# Patient Record
Sex: MALE | Race: WHITE | NOT HISPANIC OR LATINO | URBAN - NONMETROPOLITAN AREA
[De-identification: names, ages, dates, MRNs, and addresses within clinical notes are randomized per-mention and may not be internally consistent; named-entity substitution may affect disease eponyms.]

---

## 2018-02-14 NOTE — PATIENT DISCUSSION
GLS CHECK   NEW MR TODAY. PT GIVEN NEW GLS RX TODAY TO HAVE GLS REMADE AT [facility]. RETURN AS [address].

## 2019-02-07 ENCOUNTER — IMPORTED ENCOUNTER (OUTPATIENT)
Dept: URBAN - NONMETROPOLITAN AREA CLINIC 1 | Facility: CLINIC | Age: 59
End: 2019-02-07

## 2019-02-07 PROCEDURE — 92014 COMPRE OPH EXAM EST PT 1/>: CPT

## 2019-02-07 PROCEDURE — 92015 DETERMINE REFRACTIVE STATE: CPT

## 2019-02-07 PROCEDURE — 92310 CONTACT LENS FITTING OU: CPT

## 2019-02-07 NOTE — PATIENT DISCUSSION
Doing well with Acuvue [place] for Presyopia OU. BF rx given. Has SV computer rx (with +1.50 over).; 's Notes: [facility] [place] supply & logistics

## 2020-03-11 ENCOUNTER — IMPORTED ENCOUNTER (OUTPATIENT)
Dept: URBAN - NONMETROPOLITAN AREA CLINIC 1 | Facility: CLINIC | Age: 60
End: 2020-03-11

## 2020-03-11 PROCEDURE — 92014 COMPRE OPH EXAM EST PT 1/>: CPT

## 2020-03-11 PROCEDURE — 92310 CONTACT LENS FITTING OU: CPT

## 2020-03-11 PROCEDURE — 92015 DETERMINE REFRACTIVE STATE: CPT

## 2020-03-11 NOTE — PATIENT DISCUSSION
Myopia-Discussed diagnosis with patient. -Explained that people who are myopic are at a higher risk for developing RD/RT and reviewed associated S&S.-Pt to contact our office if symptoms develop. Astigmatism-Discussed diagnosis with patient. Presbyopia-Discussed diagnosis with patient. Updated spec Rx given. Recommend lens that will provide comfort as well as protect safety and health of eyes. CL wear-CLs fit and center well.-Stressed that patient should not sleep in CL. -Updated CL Rx given. -CL care and precautions given.; 's Notes: [facility] [place] [employer] supply & logistics

## 2021-03-12 ENCOUNTER — IMPORTED ENCOUNTER (OUTPATIENT)
Dept: URBAN - NONMETROPOLITAN AREA CLINIC 1 | Facility: CLINIC | Age: 61
End: 2021-03-12

## 2021-03-12 ENCOUNTER — PREPPED CHART (OUTPATIENT)
Dept: RURAL CLINIC 1 | Facility: CLINIC | Age: 61
End: 2021-03-12

## 2021-03-12 PROCEDURE — 92310 CONTACT LENS FITTING OU: CPT

## 2021-03-12 PROCEDURE — 92014 COMPRE OPH EXAM EST PT 1/>: CPT

## 2021-03-12 PROCEDURE — 92015 DETERMINE REFRACTIVE STATE: CPT

## 2021-03-12 NOTE — PATIENT DISCUSSION
Myopia-Discussed diagnosis with patient. -Explained that people who are myopic are at a higher risk for developing RD/RT and reviewed associated S&S.-Pt to contact our office if symptoms develop. Astigmatism-Discussed diagnosis with patient. Presbyopia-Discussed diagnosis with patient. Updated spec Rx given. Recommend lens that will provide comfort as well as protect safety and health of eyes. CL wear-CLs fit and center well.-Stressed that patient should not sleep in CL. -Updated CL Rx given. -CL care and precautions given.; 's Notes: [facility] [place] [employer] supply & logistics

## 2022-03-25 ENCOUNTER — COMPREHENSIVE EXAM (OUTPATIENT)
Dept: RURAL CLINIC 1 | Facility: CLINIC | Age: 62
End: 2022-03-25

## 2022-03-25 DIAGNOSIS — H52.13: ICD-10-CM

## 2022-03-25 PROCEDURE — 92015 DETERMINE REFRACTIVE STATE: CPT

## 2022-03-25 PROCEDURE — 92014 COMPRE OPH EXAM EST PT 1/>: CPT

## 2022-03-25 PROCEDURE — 92310-E CONTACT LENS FITTING ESTABLISH PATIENT

## 2022-03-25 ASSESSMENT — VISUAL ACUITY
OS_CC: 20/40
OS_CC: 20/20
OS_CC: 20/25
OU_CC: 20/20
OD_CC: 20/30+2
OU_CC: 20/20
OD_CC: 20/25
OD_CC: 20/30+2
OS_CC: 20/20
OD_CC: 20/30-2
OU_CC: 20/25+2
OU_CC: 20/25

## 2022-03-25 ASSESSMENT — TONOMETRY
OD_IOP_MMHG: 18
OS_IOP_MMHG: 19

## 2022-04-10 ASSESSMENT — VISUAL ACUITY
OS_SC: 20/25
OD_SC: 20/20
OS_SC: 20/25
OD_CC: J1
OD_SC: 20/20
OS_SC: J1
OU_SC: J1
OD_SC: 20/25-1
OS_SC: 20/25-1
OU_CC: J1
OS_CC: J1
OD_SC: J1

## 2022-04-10 ASSESSMENT — TONOMETRY
OS_IOP_MMHG: 16
OD_IOP_MMHG: 14
OS_IOP_MMHG: 18
OD_IOP_MMHG: 18
OD_IOP_MMHG: 16
OS_IOP_MMHG: 14

## 2024-03-28 ENCOUNTER — ESTABLISHED PATIENT (OUTPATIENT)
Dept: RURAL CLINIC 1 | Facility: CLINIC | Age: 64
End: 2024-03-28

## 2024-03-28 DIAGNOSIS — H52.13: ICD-10-CM

## 2024-03-28 DIAGNOSIS — H52.4: ICD-10-CM

## 2024-03-28 DIAGNOSIS — H25.9: ICD-10-CM

## 2024-03-28 PROCEDURE — 92310-E CONTACT LENS FITTING ESTABLISH PATIENT

## 2024-03-28 PROCEDURE — 92014 COMPRE OPH EXAM EST PT 1/>: CPT

## 2024-03-28 PROCEDURE — 92015 DETERMINE REFRACTIVE STATE: CPT

## 2024-03-28 ASSESSMENT — TONOMETRY
OD_IOP_MMHG: 21
OS_IOP_MMHG: 21

## 2024-03-28 ASSESSMENT — VISUAL ACUITY
OS_CC: 20/20-1
OU_CC: 20/20
OD_CC: 20/20-1

## 2025-04-03 ENCOUNTER — COMPREHENSIVE EXAM (OUTPATIENT)
Age: 65
End: 2025-04-03

## 2025-04-03 DIAGNOSIS — H52.13: ICD-10-CM

## 2025-04-03 DIAGNOSIS — H25.9: ICD-10-CM

## 2025-04-03 DIAGNOSIS — H52.4: ICD-10-CM

## 2025-04-03 PROCEDURE — 92310-1 LEVEL 1 SOFT LENS UPDATE

## 2025-04-03 PROCEDURE — 92015 DETERMINE REFRACTIVE STATE: CPT

## 2025-04-03 PROCEDURE — 92014 COMPRE OPH EXAM EST PT 1/>: CPT
